# Patient Record
Sex: FEMALE | Race: WHITE | Employment: UNEMPLOYED | URBAN - METROPOLITAN AREA
[De-identification: names, ages, dates, MRNs, and addresses within clinical notes are randomized per-mention and may not be internally consistent; named-entity substitution may affect disease eponyms.]

---

## 2024-11-04 ENCOUNTER — OFFICE VISIT (OUTPATIENT)
Age: 39
End: 2024-11-04

## 2024-11-04 ENCOUNTER — TELEPHONE (OUTPATIENT)
Age: 39
End: 2024-11-04

## 2024-11-04 VITALS
BODY MASS INDEX: 27.03 KG/M2 | RESPIRATION RATE: 16 BRPM | HEIGHT: 67 IN | SYSTOLIC BLOOD PRESSURE: 126 MMHG | TEMPERATURE: 98.5 F | WEIGHT: 172.2 LBS | OXYGEN SATURATION: 98 % | HEART RATE: 95 BPM | DIASTOLIC BLOOD PRESSURE: 83 MMHG

## 2024-11-04 DIAGNOSIS — J06.9 UPPER RESPIRATORY TRACT INFECTION, UNSPECIFIED TYPE: ICD-10-CM

## 2024-11-04 DIAGNOSIS — R05.1 ACUTE COUGH: Primary | ICD-10-CM

## 2024-11-04 RX ORDER — CYCLOBENZAPRINE HCL 5 MG
5 TABLET ORAL 3 TIMES DAILY PRN
COMMUNITY

## 2024-11-04 ASSESSMENT — ENCOUNTER SYMPTOMS: COUGH: 1

## 2024-11-04 NOTE — PROGRESS NOTES
(172 lb 3.2 oz)   Height: 1.71 m (5' 7.32\")        No Known Allergies    Current Outpatient Medications   Medication Sig Dispense Refill    cyclobenzaprine (FLEXERIL) 5 MG tablet Take 1 tablet by mouth 3 times daily as needed for Muscle spasms       No current facility-administered medications for this visit.        History reviewed. No pertinent past medical history.     History reviewed. No pertinent surgical history.     Social History:   Social Connections: Not on file        Patient Care Team:  None, None as PCP - General    There is no problem list on file for this patient.           I have discussed the results, diagnosis and treatment plan with the patient. The patient also understands that early in the process of an illness, an urgent care workup can be falsely reassuring. Routine discharge counseling and specific return precautions discussed with patient and the patient understands that worsening, changing or persistent symptoms should prompt an immediate return to the urgent care or emergency department. Patient/Guardian expressed understanding and agrees with the discharge plan. No further questions at time of discharge.       An electronic signature was used to authenticate this note.  -- CHICO Wolf - CNP

## 2024-11-04 NOTE — PATIENT INSTRUCTIONS
Thank you for visiting [facility] today.    Nasal Congestion:  Flonase (over the counter) nasal spray, once a day  Saline irrigation kits help wash out sinuses 1-2 times a day  Normal saline nasal spray  Afrin nasal spray for no more than 3-5 days    Cough:  Throat lozenges, hot tea, and honey may help  Vicks VapoRub at night to help with cough and relieve muscles aches and pain  If not prescribed a cough medication, Delsym or Robitussin are options.  It is an over the counter cough medication containing dextromethorphan to help suppress cough at night  If you have high blood pressure, take Coricidin HBP (or the generic form) instead.  Follow instructions on the box.  For thick mucus, take Mucinex (with Guafenesin only) to help thin the mucus.  Follow instructions on the box.  You will need to drink plenty of water with this medication.    Sore Throat:  Lozenges, as needed. Cepacol lozenges will help numb the throat  Chloraseptic spray also helps to numb throat pain  Salt water gargles to soothe throat pain    Sinus pain/pressure:  Warm, wet towel on face to help with facial sinus pain/pressure    Headache/Pain Fever/Body Aches:  If you can take NSAIDs, take Ibuprofen 400-800mg every 8 hours as needed  If you cannot take NSAIDs, take Tylenol 325-500mg every 6 hours as needed    Miscellanous:  Zyrtec/Xyzal/Allegra/Claritin during the day or Benadryl at night may help with allergies.  These medications also come in decongestant forms and may be used if you are having nasal/sinus congestion.  Simple foods like chicken noodle soup, smoothies, hot tea with lemon and honey may also provide some relief.  Cool mist humidifier  Stay hydrated  Vitamin C 75-90 mg daily  Zinc 40 mg daily    Please follow up with your primary care provider within 2-5 days if your signs and symptoms have not resolved or worsened.  Please be mindful of features that warrant immediate attention:  new onset fever, difficulty breathing,

## 2024-11-05 NOTE — TELEPHONE ENCOUNTER
Cipriano Hahn NP attempted calling patient to discuss x-ray result. Unable to reach patient and was unable to LVM.